# Patient Record
Sex: FEMALE | Race: WHITE
[De-identification: names, ages, dates, MRNs, and addresses within clinical notes are randomized per-mention and may not be internally consistent; named-entity substitution may affect disease eponyms.]

---

## 2017-07-20 ENCOUNTER — HOSPITAL ENCOUNTER (OUTPATIENT)
Dept: HOSPITAL 76 - DI | Age: 62
Discharge: HOME | End: 2017-07-20
Attending: FAMILY MEDICINE
Payer: MEDICAID

## 2017-07-20 DIAGNOSIS — Z12.31: Primary | ICD-10-CM

## 2017-07-20 PROCEDURE — 77067 SCR MAMMO BI INCL CAD: CPT

## 2017-07-21 NOTE — MAMMOGRAPHY REPORT
DIGITAL SCREENING MAMMOGRAM: 07/20/2017

 

CLINICAL INDICATION: A 62-year-old with history of benign excisional biopsy, for screening. 

 

COMPARISON: 10/2015, 09/2015, 08/2013, 12/2011, 12/2007. 

 

TECHNIQUE:  Routine CC and MLO projections were obtained of the breasts. Bilateral laterally exaggera
aleksandar craniocaudal views.

 

FINDINGS:  The breasts demonstrate heterogeneously dense fibroglandular parenchyma bilaterally. No crowell
spicious masses, clustered microcalcifications, or regions of architectural distortion are identified
.  

 

IMPRESSION:  BENIGN FINDINGS. 

 

RECOMMENDATION: ROUTINE ANNUAL SCREENING UNLESS OTHERWISE CLINICALLY INDICATED. 

 

BIRADS CATEGORY 2-BENIGN FINDINGS. 

 

STANDARD QUALIFYING STATEMENTS

 

1. This examination was reviewed with the aid of Computer-Aided Detection (CAD).

 

2. A negative or benign imaging report should not delay biopsy if clinically suspicious findings are 
present. Consider surgical consultation if warranted. More than 5% of cancers are not identified by i
maging.

 

3. Dense breasts may obscure an underlying neoplasm.

 

 

 

DD:07/21/2017 13:23:34  DT: 07/21/2017 17:21  JOB #: [id number]  EXT JOB #:[id number]

## 2017-09-26 ENCOUNTER — HOSPITAL ENCOUNTER (OUTPATIENT)
Dept: HOSPITAL 76 - SDS | Age: 62
Discharge: HOME | End: 2017-09-26
Attending: SURGERY
Payer: MEDICAID

## 2017-09-26 VITALS — SYSTOLIC BLOOD PRESSURE: 114 MMHG | DIASTOLIC BLOOD PRESSURE: 72 MMHG

## 2017-09-26 DIAGNOSIS — D12.0: ICD-10-CM

## 2017-09-26 DIAGNOSIS — Z12.11: Primary | ICD-10-CM

## 2017-09-26 DIAGNOSIS — K57.30: ICD-10-CM

## 2017-09-26 DIAGNOSIS — Z87.891: ICD-10-CM

## 2017-09-26 DIAGNOSIS — D12.3: ICD-10-CM

## 2017-09-26 DIAGNOSIS — I10: ICD-10-CM

## 2017-09-26 DIAGNOSIS — D12.5: ICD-10-CM

## 2017-09-26 PROCEDURE — 0DBE8ZX EXCISION OF LARGE INTESTINE, VIA NATURAL OR ARTIFICIAL OPENING ENDOSCOPIC, DIAGNOSTIC: ICD-10-PCS | Performed by: SURGERY

## 2017-09-26 PROCEDURE — 45384 COLONOSCOPY W/LESION REMOVAL: CPT

## 2018-05-25 ENCOUNTER — HOSPITAL ENCOUNTER (OUTPATIENT)
Dept: HOSPITAL 76 - LAB.WCP | Age: 63
Discharge: HOME | End: 2018-05-25
Attending: FAMILY MEDICINE
Payer: MEDICAID

## 2018-05-25 DIAGNOSIS — M34.9: ICD-10-CM

## 2018-05-25 DIAGNOSIS — Z00.00: Primary | ICD-10-CM

## 2018-05-25 LAB
ALBUMIN DIAFP-MCNC: 4.2 G/DL (ref 3.2–5.5)
ALBUMIN/GLOB SERPL: 1.6 {RATIO} (ref 1–2.2)
ALP SERPL-CCNC: 55 IU/L (ref 42–121)
ALT SERPL W P-5'-P-CCNC: 20 IU/L (ref 10–60)
ANION GAP SERPL CALCULATED.4IONS-SCNC: 5 MMOL/L (ref 6–13)
AST SERPL W P-5'-P-CCNC: 18 IU/L (ref 10–42)
BASOPHILS NFR BLD AUTO: 0 10^3/UL (ref 0–0.1)
BASOPHILS NFR BLD AUTO: 0.6 %
BILIRUB BLD-MCNC: 0.7 MG/DL (ref 0.2–1)
BUN SERPL-MCNC: 18 MG/DL (ref 6–20)
CALCIUM UR-MCNC: 8.6 MG/DL (ref 8.5–10.3)
CHLORIDE SERPL-SCNC: 106 MMOL/L (ref 101–111)
CHOLEST SERPL-MCNC: 207 MG/DL
CO2 SERPL-SCNC: 29 MMOL/L (ref 21–32)
CREAT SERPLBLD-SCNC: 0.6 MG/DL (ref 0.4–1)
EOSINOPHIL # BLD AUTO: 0.1 10^3/UL (ref 0–0.7)
EOSINOPHIL NFR BLD AUTO: 3.9 %
ERYTHROCYTE [DISTWIDTH] IN BLOOD BY AUTOMATED COUNT: 13.8 % (ref 12–15)
EST. AVERAGE GLUCOSE BLD GHB EST-MCNC: 117 MG/DL (ref 70–100)
GFRSERPLBLD MDRD-ARVRAT: 101 ML/MIN/{1.73_M2} (ref 89–?)
GLOBULIN SER-MCNC: 2.6 G/DL (ref 2.1–4.2)
GLUCOSE SERPL-MCNC: 102 MG/DL (ref 70–100)
HB2 TOTAL: 15.3 G/DL
HBA1C BLD-MCNC: 0.59 G/DL
HDLC SERPL-MCNC: 70 MG/DL
HDLC SERPL: 3 {RATIO} (ref ?–4.4)
HEMOGLOBIN A1C %: 5.7 % (ref 4.6–6.2)
HGB UR QL STRIP: 14 G/DL (ref 12–16)
LDLC SERPL CALC-MCNC: 120 MG/DL
LDLC/HDLC SERPL: 1.7 {RATIO} (ref ?–4.4)
LYMPHOCYTES # SPEC AUTO: 1 10^3/UL (ref 1.5–3.5)
LYMPHOCYTES NFR BLD AUTO: 28.6 %
MCH RBC QN AUTO: 30.5 PG (ref 27–31)
MCHC RBC AUTO-ENTMCNC: 33.6 G/DL (ref 32–36)
MCV RBC AUTO: 90.5 FL (ref 81–99)
MONOCYTES # BLD AUTO: 0.4 10^3/UL (ref 0–1)
MONOCYTES NFR BLD AUTO: 12.3 %
NEUTROPHILS # BLD AUTO: 1.9 10^3/UL (ref 1.5–6.6)
NEUTROPHILS # SNV AUTO: 3.4 X10^3/UL (ref 4.8–10.8)
NEUTROPHILS NFR BLD AUTO: 54.6 %
PDW BLD AUTO: 9.6 FL (ref 7.9–10.8)
PLATELET # BLD: 146 10^3/UL (ref 130–450)
PROT SPEC-MCNC: 6.8 G/DL (ref 6.7–8.2)
RBC MAR: 4.6 10^6/UL (ref 4.2–5.4)
RHEUMATOID FACT SER QL: NEGATIVE
SODIUM SERPLBLD-SCNC: 140 MMOL/L (ref 135–145)
VLDLC SERPL-SCNC: 17 MG/DL

## 2018-05-25 PROCEDURE — 36415 COLL VENOUS BLD VENIPUNCTURE: CPT

## 2018-05-25 PROCEDURE — 86430 RHEUMATOID FACTOR TEST QUAL: CPT

## 2018-05-25 PROCEDURE — 86038 ANTINUCLEAR ANTIBODIES: CPT

## 2018-05-25 PROCEDURE — 80053 COMPREHEN METABOLIC PANEL: CPT

## 2018-05-25 PROCEDURE — 83721 ASSAY OF BLOOD LIPOPROTEIN: CPT

## 2018-05-25 PROCEDURE — 80061 LIPID PANEL: CPT

## 2018-05-25 PROCEDURE — 83036 HEMOGLOBIN GLYCOSYLATED A1C: CPT

## 2018-05-25 PROCEDURE — 84443 ASSAY THYROID STIM HORMONE: CPT

## 2018-05-25 PROCEDURE — 85025 COMPLETE CBC W/AUTO DIFF WBC: CPT

## 2018-05-29 LAB — ANA SER QL: NEGATIVE

## 2021-09-14 ENCOUNTER — HOSPITAL ENCOUNTER (OUTPATIENT)
Dept: HOSPITAL 76 - LAB.N | Age: 66
Discharge: HOME | End: 2021-09-14
Attending: FAMILY MEDICINE
Payer: MEDICARE

## 2021-09-14 DIAGNOSIS — Z13.818: ICD-10-CM

## 2021-09-14 DIAGNOSIS — R03.0: Primary | ICD-10-CM

## 2021-09-14 LAB
ALBUMIN DIAFP-MCNC: 4.1 G/DL (ref 3.2–5.5)
ALBUMIN/GLOB SERPL: 1.4 {RATIO} (ref 1–2.2)
ALP SERPL-CCNC: 64 IU/L (ref 42–121)
ALT SERPL W P-5'-P-CCNC: 19 IU/L (ref 10–60)
ANION GAP SERPL CALCULATED.4IONS-SCNC: 9 MMOL/L (ref 6–13)
AST SERPL W P-5'-P-CCNC: 19 IU/L (ref 10–42)
BASOPHILS # BLD MANUAL: 0 10^3/UL (ref 0–0.1)
BASOPHILS NFR BLD AUTO: 0.9 %
BASOPHILS NFR SPEC MANUAL: 1 %
BILIRUB BLD-MCNC: 0.8 MG/DL (ref 0.2–1)
BUN SERPL-MCNC: 15 MG/DL (ref 6–20)
CALCIUM UR-MCNC: 9 MG/DL (ref 8.5–10.3)
CHLORIDE SERPL-SCNC: 104 MMOL/L (ref 101–111)
CHOLEST SERPL-MCNC: 215 MG/DL
CLARITY UR REFRACT.AUTO: CLEAR
CO2 SERPL-SCNC: 28 MMOL/L (ref 21–32)
CREAT SERPLBLD-SCNC: 0.7 MG/DL (ref 0.4–1)
EOSINOPHIL # BLD MANUAL: 0.1 10^3/UL (ref 0–0.7)
EOSINOPHIL NFR BLD AUTO: 3.2 %
ERYTHROCYTE [DISTWIDTH] IN BLOOD BY AUTOMATED COUNT: 13.2 % (ref 12–15)
GFRSERPLBLD MDRD-ARVRAT: 83 ML/MIN/{1.73_M2} (ref 89–?)
GLOBULIN SER-MCNC: 2.9 G/DL (ref 2.1–4.2)
GLUCOSE SERPL-MCNC: 104 MG/DL (ref 70–100)
GLUCOSE UR QL STRIP.AUTO: NEGATIVE MG/DL
HCT VFR BLD AUTO: 43.7 % (ref 37–47)
HDLC SERPL-MCNC: 75 MG/DL
HDLC SERPL: 2.9 {RATIO} (ref ?–4.4)
HGB UR QL STRIP: 13.8 G/DL (ref 12–16)
KETONES UR QL STRIP.AUTO: NEGATIVE MG/DL
LDLC SERPL CALC-MCNC: 126 MG/DL
LDLC/HDLC SERPL: 1.7 {RATIO} (ref ?–4.4)
LYMPH ABN NFR BLD MANUAL: 0 %
LYMPHOBLASTS # BLD: 22 %
LYMPHOCYTES # BLD MANUAL: 1.1 10^3/UL (ref 1.5–3.5)
LYMPHOCYTES NFR BLD AUTO: 32.9 %
MANUAL DIF COMMENT BLD-IMP: (no result)
MCH RBC QN AUTO: 30.5 PG (ref 27–31)
MCHC RBC AUTO-ENTMCNC: 31.6 G/DL (ref 32–36)
MCV RBC AUTO: 96.5 FL (ref 81–99)
MONOCYTES # BLD MANUAL: 0.7 10^3/UL (ref 0–1)
MONOCYTES NFR BLD AUTO: 9.9 %
MUCOUS THREADS URNS QL MICRO: (no result)
NEUTROPHILS # SNV AUTO: 4.4 X10^3/UL (ref 4.8–10.8)
NEUTROPHILS NFR BLD AUTO: 52.9 %
NEUTROPHILS NFR BLD MANUAL: 2.4 10^3/UL (ref 1.5–6.6)
NEUTS BAND NFR BLD: 1 %
NITRITE UR QL STRIP.AUTO: NEGATIVE
PDW BLD AUTO: 10.9 FL (ref 7.9–10.8)
PH UR STRIP.AUTO: 7.5 PH (ref 5–7.5)
PLATELET # BLD: 153 10^3/UL (ref 130–450)
POTASSIUM SERPL-SCNC: 4.3 MMOL/L (ref 3.5–5)
PROT SPEC-MCNC: 7 G/DL (ref 6.7–8.2)
PROT UR STRIP.AUTO-MCNC: NEGATIVE MG/DL
RBC # UR STRIP.AUTO: NEGATIVE /UL
RBC # URNS HPF: (no result) /HPF (ref 0–5)
RBC MAR: 4.53 10^6/UL (ref 4.2–5.4)
SODIUM SERPLBLD-SCNC: 141 MMOL/L (ref 135–145)
SP GR UR STRIP.AUTO: 1.01 (ref 1–1.03)
SQUAMOUS URNS QL MICRO: (no result)
TRIGL P FAST SERPL-MCNC: 68 MG/DL
UROBILINOGEN UR QL STRIP.AUTO: (no result) E.U./DL
UROBILINOGEN UR STRIP.AUTO-MCNC: NEGATIVE MG/DL
VARIANT LYMPHS NFR BLD MANUAL: 4 %
VLDLC SERPL-SCNC: 14 MG/DL
WBC # UR MANUAL: (no result) /HPF (ref 0–5)

## 2021-09-14 PROCEDURE — 83721 ASSAY OF BLOOD LIPOPROTEIN: CPT

## 2021-09-14 PROCEDURE — 87086 URINE CULTURE/COLONY COUNT: CPT

## 2021-09-14 PROCEDURE — 80061 LIPID PANEL: CPT

## 2021-09-14 PROCEDURE — 85025 COMPLETE CBC W/AUTO DIFF WBC: CPT

## 2021-09-14 PROCEDURE — 80053 COMPREHEN METABOLIC PANEL: CPT

## 2021-09-14 PROCEDURE — 36415 COLL VENOUS BLD VENIPUNCTURE: CPT

## 2021-09-14 PROCEDURE — 81001 URINALYSIS AUTO W/SCOPE: CPT

## 2021-09-14 PROCEDURE — 86803 HEPATITIS C AB TEST: CPT

## 2021-09-15 LAB
HEPATITIS C ANTIBODY: (no result)
SIGNAL TO CUT-OFF: 0 (ref ?–1)

## 2021-11-02 ENCOUNTER — HOSPITAL ENCOUNTER (OUTPATIENT)
Dept: HOSPITAL 76 - DI | Age: 66
Discharge: HOME | End: 2021-11-02
Attending: GENERAL ACUTE CARE HOSPITAL
Payer: MEDICARE

## 2021-11-02 ENCOUNTER — HOSPITAL ENCOUNTER (OUTPATIENT)
Dept: HOSPITAL 76 - DI | Age: 66
Discharge: HOME | End: 2021-11-02
Attending: FAMILY MEDICINE
Payer: MEDICARE

## 2021-11-02 DIAGNOSIS — Z78.0: Primary | ICD-10-CM

## 2021-11-02 DIAGNOSIS — Z12.31: Primary | ICD-10-CM

## 2021-11-02 NOTE — DEXA REPORT
PROCEDURE: Dexa Spine and/or Hip

 

INDICATIONS: POST MENOPAUSAL

 

TECHNIQUE: Dual energy x-ray absorptiometry (DXA) was performed on a Magnetic Software System.  Regions measur
ed are the AP Spine, femoral neck, and if needed forearm.

 

COMPARISON: None.

  

FINDINGS:

 

Lumbar Spine: 

Bone Mineral Density   1.103 g/cm/cm,T score  -0.6, normal bone density

 

Left Femoral Neck:

Bone Mineral Density  0.944 g/cm/cm, T score -0.5, normal bone density

 

(T score greater or equal to -1.0: NORMAL)

(T score from -1.1 to -2.4: OSTEOPENIA)

(T score less than or equal to -2.5 to: OSTEOPOROSIS)

 

Impression: Normal bone density.

 

Patients with diagnosis of osteoporosis or osteopenia should have regular bone mineral density assess
ment.  For those eligible for Medicare, routine testing is allowed once every 2 years.  Testing frequ
ency can be increased for patients who have rapidly progressing disease or for those who are receivin
g medical therapy to restore bone mass.

 

Reviewed by: Wayne Narvaez MD on 11/2/2021 2:40 PM PDT

Approved by: Wayne Narvaez MD on 11/2/2021 2:40 PM PDT

 

 

Station ID:  [id number]

## 2021-11-15 NOTE — MAMMOGRAPHY REPORT
BILATERAL DIGITAL SCREENING MAMMOGRAM 3D/2D: 11/2/2021

 

CLINICAL: Routine screening.  

 

Comparison is made to exams dated:  7/20/2017 mammogram, 10/26/2015 mammogram, 10/26/2015 ultrasound,
 and 9/28/2015 mammogram - [facility].  The tissue of both breasts is heterogeneous
ly dense. This may lower the sensitivity of mammography.  

 

No significant masses, calcifications, or other findings are seen in either breast.  

There has been no significant interval change.

 

IMPRESSION: NEGATIVE

There is no mammographic evidence of malignancy. A 1 year screening mammogram is recommended.  

 

 

This exam was interpreted at Station ID: [id number].  

 

NOTE: For mammograms, a report in lay terms will be sent to the patient. Approximately 15% of breast 
malignancies will not be visualized mammographically. In the management of a palpable breast mass, a 
negative mammogram must not discourage biopsy of a clinically suspicious lesion.

 

Electronically Signed By: Emeli kovacs/penrad:11/12/2021 14:02:38  

 

 

 

ACR BI-RADS Category 1: Negative 3341F

PARENCHYMAL PATTERN: (D) - The breast(s) demonstrate(s) heterogeneously dense fibroglandular asif pratt.

BI-RADS CATEGORY: (1) - 1

RECOMMENDATION: (ANNUAL)  - Recommend routine annual screening mammography.

[id number]

1 year screening

LATERALITY: (B)

## 2023-03-08 ENCOUNTER — HOSPITAL ENCOUNTER (OUTPATIENT)
Dept: HOSPITAL 76 - DI | Age: 68
Discharge: HOME | End: 2023-03-08
Attending: GENERAL ACUTE CARE HOSPITAL
Payer: MEDICARE

## 2023-03-08 DIAGNOSIS — Z12.31: Primary | ICD-10-CM

## 2023-03-09 NOTE — MAMMOGRAPHY REPORT
BILATERAL DIGITAL SCREENING MAMMOGRAM 3D/2D: 3/8/2023

 

CLINICAL: Routine screening.  

 

Comparison is made to exams dated:  11/2/2021 mammogram - [facility], 7/20/2017 [facility]
mogram, 10/26/2015 mammogram, 9/28/2015 mammogram - [facility], and 8/23/2013 mammo
gram - [facility].  

 

Both breasts are heterogeneously dense, which may obscure small masses (category c / 51-75% glandular
 tissue).  

 

No significant masses, calcifications, or other findings are seen in either breast.  

There has been no significant interval change.

 

IMPRESSION: NEGATIVE

There is no mammographic evidence of malignancy. A 1 year screening mammogram is recommended.  

 

Based on the Tyrer Cuzick model (a risk assessment model) the patients lifetime risk is 6.6% and her
 10 year risk is 3.6%. According to the ACR, ACS, and NCCN guidelines, an annual breast MRI exam cecelia
g with mammogram is recommended if the patients lifetime risk is 20% or greater.

 

 

This exam was interpreted at Station ID: [id number].  

 

NOTE: For mammograms, a report in lay terms will be sent to the patient. Approximately 15% of breast 
malignancies will not be visualized mammographically. In the management of a palpable breast mass, a 
negative mammogram must not discourage biopsy of a clinically suspicious lesion.

 

Electronically Signed By: Emeli [email or web address]/jhony:3/8/2023 16:24:54  

 

 

letter sent: No_Letter  

ACR BI-RADS Category 1: Negative 3341F

PARENCHYMAL PATTERN: (D) - The breast(s) demonstrate(s) heterogeneously dense fibroglandular asif pratt.

BI-RADS CATEGORY: (1) - 1

Mammogram

[id number]

1 year screening

LATERALITY: (B)

## 2023-04-13 ENCOUNTER — HOSPITAL ENCOUNTER (OUTPATIENT)
Dept: HOSPITAL 76 - SDS | Age: 68
Discharge: HOME | End: 2023-04-13
Attending: SURGERY
Payer: MEDICARE

## 2023-04-13 VITALS — SYSTOLIC BLOOD PRESSURE: 112 MMHG | DIASTOLIC BLOOD PRESSURE: 82 MMHG

## 2023-04-13 DIAGNOSIS — Z86.010: ICD-10-CM

## 2023-04-13 DIAGNOSIS — Z12.11: Primary | ICD-10-CM

## 2023-04-13 DIAGNOSIS — K57.30: ICD-10-CM

## 2023-04-13 DIAGNOSIS — E66.9: ICD-10-CM

## 2023-04-13 DIAGNOSIS — Z87.19: ICD-10-CM

## 2023-04-13 NOTE — ANESTHESIA
Pre-Anesthesia VS, & Labs





- Diagnosis





hx colon polyps





- Procedure





colonoscopy


Vital Signs: 





                                        











Temp Pulse Resp BP Pulse Ox O2 Flow Rate


 


 36.5 C   64   10 L  140/94 H  98    


 


 04/13/23 06:41  04/13/23 06:41  04/13/23 06:41  04/13/23 06:41  04/13/23 06:41 

 











Height: 5 ft 7 in


Weight (kg): 94.3 kg


Body Mass Index: 32.5


BMI Classification: Obese





- NPO


>8 hours


Last Fluid Intake: am prep





- Pregnancy


Is Patient Pregnant?: No





- Lab Results


Lab results reviewed: Yes





Home Medications and Allergies


Home Medications: 


Ambulatory Orders





No Known Home Medications  04/12/23 











                                        





No Known Home Medications  04/12/23 








Allergies/Adverse Reactions: 


                                    Allergies











Allergy/AdvReac Type Severity Reaction Status Date / Time


 


Penicillins Allergy Severe Anaphylaxis Verified 09/25/17 14:22


 


bee venom protein (honey bee) Allergy Unknown Anaphylaxis Verified 09/25/17 

14:22














Anes History & Medical History





- Anesthetic History


Anesthesia Complications: reports: No previous complications


Family history of Anesthesia Complications: Denies


Family history of Malignant Hyperthermia: Denies





- Medical History


Cardiovascular: reports: None


Pulmonary: reports: None


Gastrointestinal: reports: None


Urinary: reports: None


Musculoskeletal: reports: None


Endocrine/Autoimmune: reports: None


Skin: reports: None





- Surgical History


General: reports: Colonoscopy





Exam


General: Alert, Oriented x3, Cooperative


Dental: WNL


Mouth Opening: 3 Fingerbreadth


Mallampati classification: II


Thyromental Distance: 4-6 cm


Respiratory: Lungs clear, Normal breath sounds, No respiratory distress


Cardiovascular: Regular rate


Neurological: Normal speech


Mental/Cognitive Status: Alert/Oriented X3, Normal for patient


Cognitive Status: Within normal limits





Plan


Anesthesia Type: Total IV


Consent for Procedure(s) Verified and Reviewed: Yes


Code Status: Attempt Resuscitation


ASA classification: 2-Mild systemic disease


Is this case an emergency?: No

## 2023-04-13 NOTE — ANESTHESIA POST OP EVALUATION
Anesthesia Post Eval





- Post Anesthesia Eval


Vitals: 





                                Last Vital Signs











Temp  36.3 C L  04/13/23 08:03


 


Pulse  70   04/13/23 08:03


 


Resp  16   04/13/23 08:03


 


BP  104/56 L  04/13/23 08:03


 


Pulse Ox  94   04/13/23 08:03


 


O2 Flow Rate      











CV Function Including HR & BP: Stable


Pain Control: Satisfactory


Nausea & Vomiting: Negative


Mental Status: Baseline


Respiratory Status: Airway Patent


Hydration Status: Satisfactory


Anesthesia Complications: None

## 2023-08-08 ENCOUNTER — HOSPITAL ENCOUNTER (OUTPATIENT)
Dept: HOSPITAL 76 - LAB | Age: 68
Discharge: HOME | End: 2023-08-08
Attending: NURSE PRACTITIONER
Payer: MEDICARE

## 2023-08-08 DIAGNOSIS — R53.83: Primary | ICD-10-CM

## 2023-08-08 DIAGNOSIS — Z13.220: ICD-10-CM

## 2023-08-08 DIAGNOSIS — E66.9: ICD-10-CM

## 2023-08-08 LAB
ALBUMIN DIAFP-MCNC: 4 G/DL (ref 3.2–5.5)
ALBUMIN/GLOB SERPL: 1.7 {RATIO} (ref 1–2.2)
ALP SERPL-CCNC: 72 IU/L (ref 42–121)
ALT SERPL W P-5'-P-CCNC: 17 IU/L (ref 10–60)
ANION GAP SERPL CALCULATED.4IONS-SCNC: 2 MMOL/L (ref 6–13)
AST SERPL W P-5'-P-CCNC: 13 IU/L (ref 10–42)
BASOPHILS NFR BLD AUTO: 0 10^3/UL (ref 0–0.1)
BASOPHILS NFR BLD AUTO: 0.6 %
BILIRUB BLD-MCNC: 0.5 MG/DL (ref 0.2–1)
BUN SERPL-MCNC: 17 MG/DL (ref 6–20)
CALCIUM UR-MCNC: 8.8 MG/DL (ref 8.5–10.3)
CHLORIDE SERPL-SCNC: 106 MMOL/L (ref 101–111)
CHOLEST SERPL-MCNC: 188 MG/DL
CO2 SERPL-SCNC: 32 MMOL/L (ref 21–32)
CREAT SERPLBLD-SCNC: 0.6 MG/DL (ref 0.6–1.3)
EOSINOPHIL # BLD AUTO: 0.2 10^3/UL (ref 0–0.7)
EOSINOPHIL NFR BLD AUTO: 5.2 %
ERYTHROCYTE [DISTWIDTH] IN BLOOD BY AUTOMATED COUNT: 13.2 % (ref 12–15)
GFRSERPLBLD MDRD-ARVRAT: 99 ML/MIN/{1.73_M2} (ref 89–?)
GLOBULIN SER-MCNC: 2.3 G/DL (ref 2.1–4.2)
GLUCOSE SERPL-MCNC: 106 MG/DL (ref 74–104)
HCT VFR BLD AUTO: 43 % (ref 37–47)
HDLC SERPL-MCNC: 69 MG/DL
HDLC SERPL: 2.7 {RATIO} (ref ?–4.4)
HGB UR QL STRIP: 13.7 G/DL (ref 12–16)
LDLC SERPL CALC-MCNC: 104 MG/DL
LDLC/HDLC SERPL: 1.5 {RATIO} (ref ?–4.4)
LYMPHOCYTES # SPEC AUTO: 1 10^3/UL (ref 1.5–3.5)
LYMPHOCYTES NFR BLD AUTO: 30.6 %
MCH RBC QN AUTO: 30.2 PG (ref 27–31)
MCHC RBC AUTO-ENTMCNC: 31.9 G/DL (ref 32–36)
MCV RBC AUTO: 94.7 FL (ref 81–99)
MONOCYTES # BLD AUTO: 0.4 10^3/UL (ref 0–1)
MONOCYTES NFR BLD AUTO: 12.8 %
NEUTROPHILS # BLD AUTO: 1.7 10^3/UL (ref 1.5–6.6)
NEUTROPHILS # SNV AUTO: 3.3 X10^3/UL (ref 4.8–10.8)
NEUTROPHILS NFR BLD AUTO: 50.5 %
NRBC # BLD AUTO: 0 /100WBC
NRBC # BLD AUTO: 0 X10^3/UL
PDW BLD AUTO: 10 FL (ref 7.9–10.8)
PLATELET # BLD: 141 10^3/UL (ref 130–450)
POTASSIUM SERPL-SCNC: 3.9 MMOL/L (ref 3.5–4.5)
PROT SPEC-MCNC: 6.3 G/DL (ref 6.4–8.9)
RBC MAR: 4.54 10^6/UL (ref 4.2–5.4)
SODIUM SERPLBLD-SCNC: 140 MMOL/L (ref 135–145)
TRIGL P FAST SERPL-MCNC: 73 MG/DL (ref 48–352)
TSH SERPL-ACNC: 1.57 UIU/ML (ref 0.34–5.6)
VLDLC SERPL-SCNC: 15 MG/DL

## 2023-08-08 PROCEDURE — 83721 ASSAY OF BLOOD LIPOPROTEIN: CPT

## 2023-08-08 PROCEDURE — 80061 LIPID PANEL: CPT

## 2023-08-08 PROCEDURE — 36415 COLL VENOUS BLD VENIPUNCTURE: CPT

## 2023-08-08 PROCEDURE — 84443 ASSAY THYROID STIM HORMONE: CPT

## 2023-08-08 PROCEDURE — 85025 COMPLETE CBC W/AUTO DIFF WBC: CPT

## 2023-08-08 PROCEDURE — 80053 COMPREHEN METABOLIC PANEL: CPT
